# Patient Record
Sex: FEMALE | Race: AMERICAN INDIAN OR ALASKA NATIVE
[De-identification: names, ages, dates, MRNs, and addresses within clinical notes are randomized per-mention and may not be internally consistent; named-entity substitution may affect disease eponyms.]

---

## 2018-06-29 ENCOUNTER — HOSPITAL ENCOUNTER (EMERGENCY)
Dept: HOSPITAL 5 - ED | Age: 53
Discharge: STILL ADMITTED OR ADMINISTRATIVE | End: 2018-06-29
Payer: SELF-PAY

## 2018-06-29 VITALS — SYSTOLIC BLOOD PRESSURE: 157 MMHG | DIASTOLIC BLOOD PRESSURE: 82 MMHG

## 2018-06-29 DIAGNOSIS — Z90.710: ICD-10-CM

## 2018-06-29 DIAGNOSIS — Z95.0: ICD-10-CM

## 2018-06-29 DIAGNOSIS — I25.10: Primary | ICD-10-CM

## 2018-06-29 DIAGNOSIS — I50.9: ICD-10-CM

## 2018-06-29 DIAGNOSIS — J44.9: ICD-10-CM

## 2018-06-29 DIAGNOSIS — R07.9: ICD-10-CM

## 2018-06-29 DIAGNOSIS — F17.200: ICD-10-CM

## 2018-06-29 DIAGNOSIS — C49.9: ICD-10-CM

## 2018-06-29 DIAGNOSIS — Z98.890: ICD-10-CM

## 2018-06-29 DIAGNOSIS — I11.0: ICD-10-CM

## 2018-06-29 DIAGNOSIS — Z88.6: ICD-10-CM

## 2018-06-29 DIAGNOSIS — I25.2: ICD-10-CM

## 2018-06-29 LAB
ALBUMIN SERPL-MCNC: 4 G/DL (ref 3.9–5)
ALT SERPL-CCNC: 16 UNITS/L (ref 7–56)
APTT BLD: 30.6 SEC. (ref 24.2–36.6)
BASOPHILS # (AUTO): 0.1 K/MM3 (ref 0–0.1)
BASOPHILS NFR BLD AUTO: 0.9 % (ref 0–1.8)
BILIRUB DIRECT SERPL-MCNC: < 0.2 MG/DL (ref 0–0.2)
BUN SERPL-MCNC: 9 MG/DL (ref 7–17)
BUN/CREAT SERPL: 18 %
CALCIUM SERPL-MCNC: 9.1 MG/DL (ref 8.4–10.2)
EOSINOPHIL # BLD AUTO: 0 K/MM3 (ref 0–0.4)
EOSINOPHIL NFR BLD AUTO: 0.8 % (ref 0–4.3)
HCT VFR BLD CALC: 44.4 % (ref 30.3–42.9)
HEMOLYSIS INDEX: 12
HGB BLD-MCNC: 14.9 GM/DL (ref 10.1–14.3)
INR PPP: 0.83 (ref 0.87–1.13)
LYMPHOCYTES # BLD AUTO: 2.1 K/MM3 (ref 1.2–5.4)
LYMPHOCYTES NFR BLD AUTO: 37.1 % (ref 13.4–35)
MCH RBC QN AUTO: 31 PG (ref 28–32)
MCHC RBC AUTO-ENTMCNC: 34 % (ref 30–34)
MCV RBC AUTO: 93 FL (ref 79–97)
MONOCYTES # (AUTO): 0.5 K/MM3 (ref 0–0.8)
MONOCYTES % (AUTO): 8.3 % (ref 0–7.3)
PLATELET # BLD: 163 K/MM3 (ref 140–440)
RBC # BLD AUTO: 4.76 M/MM3 (ref 3.65–5.03)

## 2018-06-29 PROCEDURE — 85610 PROTHROMBIN TIME: CPT

## 2018-06-29 PROCEDURE — 82550 ASSAY OF CK (CPK): CPT

## 2018-06-29 PROCEDURE — 80074 ACUTE HEPATITIS PANEL: CPT

## 2018-06-29 PROCEDURE — 82553 CREATINE MB FRACTION: CPT

## 2018-06-29 PROCEDURE — G0480 DRUG TEST DEF 1-7 CLASSES: HCPCS

## 2018-06-29 PROCEDURE — 85379 FIBRIN DEGRADATION QUANT: CPT

## 2018-06-29 PROCEDURE — 83735 ASSAY OF MAGNESIUM: CPT

## 2018-06-29 PROCEDURE — 85730 THROMBOPLASTIN TIME PARTIAL: CPT

## 2018-06-29 PROCEDURE — 99284 EMERGENCY DEPT VISIT MOD MDM: CPT

## 2018-06-29 PROCEDURE — 36415 COLL VENOUS BLD VENIPUNCTURE: CPT

## 2018-06-29 PROCEDURE — 83880 ASSAY OF NATRIURETIC PEPTIDE: CPT

## 2018-06-29 PROCEDURE — 93010 ELECTROCARDIOGRAM REPORT: CPT

## 2018-06-29 PROCEDURE — 71045 X-RAY EXAM CHEST 1 VIEW: CPT

## 2018-06-29 PROCEDURE — 85025 COMPLETE CBC W/AUTO DIFF WBC: CPT

## 2018-06-29 PROCEDURE — 80048 BASIC METABOLIC PNL TOTAL CA: CPT

## 2018-06-29 PROCEDURE — 93005 ELECTROCARDIOGRAM TRACING: CPT

## 2018-06-29 PROCEDURE — 80320 DRUG SCREEN QUANTALCOHOLS: CPT

## 2018-06-29 PROCEDURE — 84484 ASSAY OF TROPONIN QUANT: CPT

## 2018-06-29 NOTE — XRAY REPORT
Portable chest:



Chest pain.



The lungs are clear and well inflated. A bipolar pacemaker is present 

with tips in good position and intact wires. Normal heart size with no 

vascular congestion. No prior exam for comparison at this time.



Impression:



No acute finding suspected.

## 2018-06-29 NOTE — EMERGENCY DEPARTMENT REPORT
ED Chest Pain HPI





- General


Chief Complaint: Chest Pain


Stated Complaint: CP


Time Seen by Provider: 06/29/18 12:26


Source: patient


Mode of arrival: Ambulatory


Limitations: No Limitations





- History of Present Illness


Initial Comments: 


This is a 53-year-old female with a history of coronary artery disease and 3 or 

4 stents.  She has been serially noncompliant for the past many months.  She 

has not even continued her Plavix.  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  However, she claims it is never in biopsy because 

her cardiologist would not approve it".  He cannot explain how they made the 

diagnosis of liposarcoma without a biopsy.  In any case she complaints of chest 

pain in the anterior substernal region for the last 2 days intermittently.  She 

is not complaining of any acute shortness of breath, diaphoresis or vomiting.  

She states that she has an AICD.  However she has not seen a cardiologist or 

primary care physician for an extended period of time.  She admits to alcohol 

abuse.





MD Complaint: chest pain


-: days(s)


Onset: during rest


Pain Location: substernal


Pain Radiation: none


Severity: moderate


Quality: pressure


Consistency: intermittent, now resolved


Improves With: nothing


Worsens With: nothing


Context: other (h/o PCI)


re: dyspnea (on exertion).  denies: nausea, vomting, diaphoresis


Other Symptoms: denies: cough, fever, syncope


Treatments Prior to Arrival: none (noncompliant with all medication)


Aspirin use within the Past 7 Days: (0) No





- Related Data


 Allergies











Allergy/AdvReac Type Severity Reaction Status Date / Time


 


naproxen [From Naprosyn] Allergy  Swelling Verified 06/29/18 11:58














Heart Score





- HEART Score


History: Moderately suspicious


EKG: Non-specific


Age: 45-65


Risk factors: > 3 risk factors or hx of atherosclerotic disease


Troponin: < normal limit


HEART Score: 5





- Critical Actions


Critical Actions: 4-6 pts:12-16.6% risk of adverse cardiac event. Should be 

admitted





ED Review of Systems


ROS: 


Stated complaint: CP


Other details as noted in HPI





Constitutional: denies: chills, fever


Eyes: denies: eye pain, eye discharge, vision change


ENT: denies: ear pain, throat pain


Respiratory: SOB with exertion.  denies: cough, wheezing


Cardiovascular: chest pain.  denies: palpitations


Endocrine: no symptoms reported


Gastrointestinal: denies: abdominal pain, nausea, diarrhea


Genitourinary: denies: urgency, dysuria, discharge


Musculoskeletal: denies: back pain, joint swelling, arthralgia


Skin: denies: rash, lesions


Neurological: denies: headache, weakness, paresthesias


Psychiatric: denies: anxiety, depression


Hematological/Lymphatic: denies: easy bleeding, easy bruising





ED Past Medical Hx





- Past Medical History


Previous Medical History?: Yes


Hx Hypertension: Yes


Hx Heart Attack/AMI: Yes (x5)


Hx Congestive Heart Failure: Yes


Hx of Cancer: Yes (liposarcoma)


Hx COPD: Yes


Additional medical history: Graves disease, CAD, Right broken collar bone





- Surgical History


Past Surgical History?: Yes


Hx Coronary Stent: Yes


Additional Surgical History: Pacemaker, Hysterectomy, Umbilical hernia repair x 

2, Left shoulder rotator cuff repair





- Social History


Smoking Status: Current Every Day Smoker


Substance Use Type: Alcohol, Prescribed





ED Physical Exam





- General


Limitations: No Limitations


General appearance: alert, in no apparent distress





- Head


Head exam: Present: atraumatic, normocephalic





- Eye


Eye exam: Present: normal appearance, PERRL, EOMI.  Absent: scleral icterus





- ENT


ENT exam: Present: mucous membranes moist





- Neck


Neck exam: Present: normal inspection.  Absent: tenderness, meningismus





- Respiratory


Respiratory exam: Present: normal lung sounds bilaterally.  Absent: respiratory 

distress





- Cardiovascular


Cardiovascular Exam: Present: regular rate, normal rhythm.  Absent: systolic 

murmur, diastolic murmur, rubs, gallop





- GI/Abdominal


GI/Abdominal exam: Present: soft, normal bowel sounds.  Absent: distended, 

tenderness, guarding, rebound, rigid





- Extremities Exam


Extremities exam: Present: other (soft tissue mass of the right leg region.  

Assessment with lipomatous growth)





- Back Exam


Back exam: Present: normal inspection.  Absent: CVA tenderness (R), CVA 

tenderness (L)





- Neurological Exam


Neurological exam: Present: alert, oriented X3, CN II-XII intact.  Absent: 

motor sensory deficit





- Psychiatric


Psychiatric exam: Present: anxious, flat affect





- Skin


Skin exam: Present: warm, dry, intact, normal color.  Absent: rash





ED Course


 Vital Signs











  06/29/18





  11:58


 


Temperature 98.8 F


 


Pulse Rate 83


 


Respiratory 16





Rate 


 


Blood Pressure 155/91


 


O2 Sat by Pulse 97





Oximetry 














- Reevaluation(s)


Reevaluation #1: 


Patient referred to Dr. Field of the hospitalist service for further care and 

evaluation.


06/29/18 13:39








JUAN MIGUEL score





- Juan Miguel Score


Age > 65: (0) No


Aspirin use within the Past 7 Days: (0) No


3 or more CAD Risk Factors: (1) Yes


2 or more Angina events in past 24 hrs: (0) No


Known CAD with more than 50% Stenosis: (1) Yes


Elevated Cardiac Markers: (0) No


ST Deviation Greater than 0.5mm: (0) No


JUAN MIGUEL Score: 2





ED Medical Decision Making





- Lab Data


Result diagrams: 


 06/29/18 12:49





 06/29/18 12:49








 Laboratory Results - last 24 hr











  06/29/18 06/29/18 06/29/18





  12:49 12:49 12:49


 


WBC  5.7  


 


RBC  4.76  


 


Hgb  14.9 H  


 


Hct  44.4 H  


 


MCV  93  


 


MCH  31  


 


MCHC  34  


 


RDW  14.1  


 


Plt Count  163  


 


Lymph % (Auto)  37.1 H  


 


Mono % (Auto)  8.3 H  


 


Eos % (Auto)  0.8  


 


Baso % (Auto)  0.9  


 


Lymph #  2.1  


 


Mono #  0.5  


 


Eos #  0.0  


 


Baso #  0.1  


 


Seg Neutrophils %  52.9  


 


Seg Neutrophils #  3.0  


 


PT   11.8 L 


 


INR   0.83 L 


 


APTT   30.6 


 


Sodium    141


 


Potassium    3.4 L


 


Chloride    105.0


 


Carbon Dioxide    22


 


Anion Gap    17


 


BUN    9


 


Creatinine    0.5 L


 


Estimated GFR    > 60


 


BUN/Creatinine Ratio    18


 


Glucose    97


 


Calcium    9.1


 


Total Creatine Kinase   


 


Troponin T    < 0.010


 


NT-Pro-B Natriuret Pep   














  06/29/18 06/29/18





  12:49 12:49


 


WBC  


 


RBC  


 


Hgb  


 


Hct  


 


MCV  


 


MCH  


 


MCHC  


 


RDW  


 


Plt Count  


 


Lymph % (Auto)  


 


Mono % (Auto)  


 


Eos % (Auto)  


 


Baso % (Auto)  


 


Lymph #  


 


Mono #  


 


Eos #  


 


Baso #  


 


Seg Neutrophils %  


 


Seg Neutrophils #  


 


PT  


 


INR  


 


APTT  


 


Sodium  


 


Potassium  


 


Chloride  


 


Carbon Dioxide  


 


Anion Gap  


 


BUN  


 


Creatinine  


 


Estimated GFR  


 


BUN/Creatinine Ratio  


 


Glucose  


 


Calcium  


 


Total Creatine Kinase   76


 


Troponin T  


 


NT-Pro-B Natriuret Pep  28.49 














- EKG Data


-: EKG Interpreted by Me


EKG shows normal: sinus rhythm, axis, intervals, QRS complexes, ST-T waves


Rate: normal





- EKG Data


Interpretation: no acute changes





- Radiology Data


Radiology results: report reviewed (chest x-ray shows the AICD device but no 

acute process normal cardiac silhouette)


Critical care attestation.: 


If time is entered above; I have spent that time in minutes in the direct care 

of this critically ill patient, excluding procedure time.








ED Disposition


Clinical Impression: 


 History of coronary artery disease, Medical non-compliance





Chest pain


Qualifiers:


 Chest pain type: unspecified Qualified Code(s): R07.9 - Chest pain, unspecified





Disposition: DC-09 OP ADMIT IP TO THIS HOSP


Is pt being admited?: Yes


Does the pt Need Aspirin: Yes


Condition: Stable


Instructions:  Chest Pain (ED)


Referrals: 


PRIMARY CARE,MD [Primary Care Provider] - 3-5 Days


Time of Disposition: 13:39

## 2018-06-29 NOTE — HISTORY AND PHYSICAL REPORT
History of Present Illness


Chief complaint: 





My chest felt tight


History of present illness: 


52 YO Female with HTN, CAD, MI, Liposarcoma, Medication Noncompliance, ETOH 

Dependence presents to ED for evaluation of Atypical chest pain. Pt seen and 

evaluated in ED. Pt symptoms resolved with therapy. Pt cardiac enzymes, ekg,and 

telemetry were not indicative of acute ischemia. Pt medically optimized and 

back to usual state of health. Pt counseled regarding medication noncompliance. 

Pt discharged home and instructed to F/U PCP, as well as cardiology for f/u 

care and further testing. Pt counseled regarding ETOH dependence, and 

instructed to F/U with AA at discharge. 








Past History


Past Medical History: CAD, hypertension


Past Surgical History: hysterectomy, hernia repair, Other (Hysterectomy)


Social history: single, smoking, alcohol abuse


Family history: diabetes, hypertension





Medications and Allergies


 Allergies











Allergy/AdvReac Type Severity Reaction Status Date / Time


 


naproxen [From Naprosyn] Allergy  Swelling Verified 06/29/18 11:58











 Home Medications











 Medication  Instructions  Recorded  Confirmed  Last Taken  Type


 


Clopidogrel Bisulfate [Plavix] 1 tab PO DAILY 06/29/18 06/29/18 Unknown History


 


Clopidogrel [Plavix] 75 mg PO QDAY #15 tablet 06/29/18  Unknown Rx


 


Furosemide [Lasix TAB] 40 mg PO QDAY 06/29/18 06/29/18 Unknown History


 


Furosemide [Lasix] 20 mg PO QDAY #15 tablet 06/29/18  Unknown Rx


 


ISOSORBIDE MONOnitrate [Imdur ER] 30 mg PO DAILY 06/29/18 06/29/18 Unknown 

History


 


Lisinopril [Zestril TAB] 2.5 mg PO QDAY #15 tab 06/29/18  Unknown Rx


 


Metoprolol Xl [Metoprolol 100 mg PO QDAY 06/29/18 06/29/18 Unknown History





SUCCINATE ER TAB]     


 


Metoprolol [Lopressor TAB] 25 mg PO DAILY #15 tablet 06/29/18  Unknown Rx


 


Nitroglycerin 0.4 mg SL Q5MIN PRN MDD 3 06/29/18 06/29/18 Unknown History


 


Pantoprazole [Protonix] 40 mg PO QDAY #30 tablet 06/29/18  Unknown Rx


 


Simvastatin [Zocor TAB] 40 mg PO QHS #15 tablet 06/29/18  Unknown Rx


 


Simvastatin [Zocor] 40 mg PO HS 06/29/18 06/29/18 Unknown History














Review of Systems


Constitutional: no weight loss, no weight gain, no fever, no chills


Ears, nose, mouth and throat: no ear pain, no ear discharge, no tinnitis, no 

decreased hearing, no nose pain, no nasal congestion


Cardiovascular: chest pain, no orthopnea, no palpitations, no rapid/irregular 

heart beat, no edema, no syncope, no lightheadedness, no shortness of breath, 

no dyspnea on exertion, no paroxysmal nocturnal dyspnea, no claudication, no 

phlebitis, no leg edema, no decreased exercise tolerance, no other


Respiratory: no cough, no cough with sputum, no excessive sputum, no hemoptysis


Gastrointestinal: no abdominal pain, no nausea, no vomiting, no diarrhea, no 

constipation, no change in bowel habits, no hematemesis


Genitourinary Female: no pelvic pain, no flank pain, no menorrhagia, no dysuria

, no urinary frequency, no urgency, no stress incontinence, no post void 

dribbling, no incomplete emptying, no urge incontinence, no mixed incontinence


Rectal: no pain, no incontinence


Musculoskeletal: no neck stiffness, no neck pain, no shooting arm pain, no arm 

numbness/tingling, no low back pain, no shooting leg pain


Integumentary: no rash, no pruritis, no redness, no sores, no wounds, no 

jaundice


Neurological: no head injury, no transient paralysis, no paralysis, no weakness

, no parathesias, no numbness, no tingling, no seizures, no syncope, no tremors


Psychiatric: no anxiety, no memory loss, no change in sleep habits, no sleep 

disturbances, no insomnia, no hypersomnia, no change in appetite, no change in 

libido, no suicidal ideation


Endocrine: no cold intolerance, no heat intolerance, no polyphagia, no 

excessive thirst, no polydipsia, no polyuria, no nocturia, no excessive sweating

, no flushing


Hematologic/Lymphatic: no easy bruising, no easy bleeding, no lymphadenopathy, 

no lymphedema


Allergic/Immunologic: no urticaria, no persistent infections, no anaphylaxis, 

no angioedema





Exam





- Constitutional


Vitals: 


 











Temp Pulse Resp BP Pulse Ox


 


 98.8 F   70   17   157/82   97 


 


 06/29/18 11:58  06/29/18 15:01  06/29/18 15:01  06/29/18 15:01  06/29/18 13:45











General appearance: Present: no acute distress, obese





- EENT


Eyes: Present: PERRL


ENT: hearing intact, clear oral mucosa





- Neck


Neck: Present: supple, normal ROM





- Respiratory


Respiratory effort: normal


Respiratory: bilateral: CTA





- Cardiovascular


Heart Sounds: Present: S1 & S2.  Absent: rub, click





- Extremities


Extremities: pulses symmetrical, No edema


Peripheral Pulses: within normal limits





- Abdominal


General gastrointestinal: Present: soft, non-tender, non-distended, normal 

bowel sounds


Female genitourinary: Present: normal





- Integumentary


Integumentary: Present: clear, warm, dry





- Musculoskeletal


Musculoskeletal: gait normal, strength equal bilaterally





- Psychiatric


Psychiatric: appropriate mood/affect, intact judgment & insight





- Neurologic


Neurologic: CNII-XII intact, moves all extremities





Results





- Labs


CBC & Chem 7: 


 06/29/18 12:49





 06/29/18 12:49


Labs: 


 Abnormal lab results











  06/29/18 06/29/18 06/29/18 Range/Units





  12:49 12:49 12:49 


 


Hgb  14.9 H    (10.1-14.3)  gm/dl


 


Hct  44.4 H    (30.3-42.9)  %


 


Lymph % (Auto)  37.1 H    (13.4-35.0)  %


 


Mono % (Auto)  8.3 H    (0.0-7.3)  %


 


PT   11.8 L   (12.2-14.9)  Sec.


 


INR   0.83 L   (0.87-1.13)  


 


Potassium    3.4 L  (3.6-5.0)  mmol/L


 


Creatinine    0.5 L  (0.7-1.2)  mg/dL














Assessment and Plan





- Patient Problems


(1) Atypical chest pain


Status: Acute   


Plan to address problem: 


Serial cardiac enxymes, ekg, telemetry, d dimer, normal. D/C home f/u pcp and 

cardiology. 








(2) Alcohol abuse


Status: Acute   


Plan to address problem: 


thiamine, folic acid, multivitamin








(3) Gastritis


Status: Acute   


Qualifiers: 


   Gastritis type: alcoholic 


Plan to address problem: 


PPi therapy, outpatient GI F/U











(4) Medical non-compliance


Status: Acute   


Plan to address problem: 


Pt counseled,
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
